# Patient Record
Sex: MALE | Race: WHITE | NOT HISPANIC OR LATINO | Employment: OTHER | URBAN - METROPOLITAN AREA
[De-identification: names, ages, dates, MRNs, and addresses within clinical notes are randomized per-mention and may not be internally consistent; named-entity substitution may affect disease eponyms.]

---

## 2022-05-03 ENCOUNTER — HOSPITAL ENCOUNTER (EMERGENCY)
Facility: CLINIC | Age: 67
Discharge: HOME | End: 2022-05-03
Attending: PHYSICIAN ASSISTANT | Admitting: PHYSICIAN ASSISTANT
Payer: MEDICARE

## 2022-05-03 VITALS
DIASTOLIC BLOOD PRESSURE: 101 MMHG | SYSTOLIC BLOOD PRESSURE: 180 MMHG | OXYGEN SATURATION: 95 % | HEART RATE: 106 BPM | RESPIRATION RATE: 20 BRPM | TEMPERATURE: 98.2 F

## 2022-05-03 DIAGNOSIS — S01.01XA LACERATION OF SCALP, INITIAL ENCOUNTER: ICD-10-CM
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
## 2022-05-03 DIAGNOSIS — S09.90XA CLOSED HEAD INJURY, INITIAL ENCOUNTER: ICD-10-CM

## 2022-05-03 PROCEDURE — 99283 EMERGENCY DEPT VISIT LOW MDM: CPT

## 2022-05-03 PROCEDURE — 12002 RPR S/N/AX/GEN/TRNK2.6-7.5CM: CPT

## 2022-05-03 PROCEDURE — 250N000009 HC RX 250: Performed by: PHYSICIAN ASSISTANT

## 2022-05-03 RX ADMIN — Medication 3 ML: at 13:31

## 2022-05-03 ASSESSMENT — ENCOUNTER SYMPTOMS
SHORTNESS OF BREATH: 0
LIGHT-HEADEDNESS: 0
NECK PAIN: 0
NUMBNESS: 0
DIZZINESS: 0
HEADACHES: 1
BACK PAIN: 0
MYALGIAS: 0
WOUND: 1

## 2022-05-03 NOTE — ED TRIAGE NOTES
Pt presents after falling and hitting his head. Stepped backwards and tripped over items in a restaurant, hitting his head on a cart.  Did not lose consciousness. Laceration noted to back of head. Bleeding controlled. Takes a baby aspirin daily.

## 2022-05-03 NOTE — DISCHARGE INSTRUCTIONS
Staples need to come out in 5-7 days. Have your primary doctor remove these.  Keep wound clean, consider placing topical antibiotic over wound for the first few days.

## 2022-05-03 NOTE — ED PROVIDER NOTES
"  History   Chief Complaint:  Head Injury       The history is provided by the patient.      Philippe Kwan is a 66 year old male with history of obesity, hypertension, hyperglycemia, and tobacco use who presents with a head injury. The patient reports that he tripped and fell backwards to the ground and hit his head about 1 hour ago at a restaurant. He reports no lightheadedness, dizziness, loss of consciousness, or shortness of breath before the fall. He reports \"seeing stars\" after the fall, but no syncope. He endorses a headache and head wound due to fall. He reports no use of blood thinners, but is on Lisinopril. The patient endorses tobacco use. The patient states that he has no concern for brain injury and declines a scan at this time. The patient is unaware of when his last tetanus vaccine was. At this time, the patient denies neck or back pain, numbness, chest pain, or leg pain. He also reports no lightheadedness, dizziness, loss of consciousness, or shortness of breath at this time. The patient reports no vision changes.       Review of Systems   Eyes: Negative for visual disturbance.   Respiratory: Negative for shortness of breath.    Cardiovascular: Negative for chest pain.   Musculoskeletal: Negative for back pain, myalgias (leg) and neck pain.   Skin: Positive for wound (back of the head).   Neurological: Positive for headaches. Negative for dizziness, syncope, light-headedness and numbness.   All other systems reviewed and are negative.    Allergies:  No Known Drug Allergies     Medications:  Lisinopril    Past Medical History:     Hypertension  Hyperglycemia  Tobacco use disorder  Obesity    Past Surgical History:    Appendectomy  Right forearm surgery    Family History:    Father: cancer    Social History:  Presents to the emergency department with his wife.   Arrives via car.  The patient is a smoker.   From [place], [place].  The patient is a .     Physical Exam     Patient " Vitals for the past 24 hrs:   BP Temp Temp src Pulse Resp SpO2   05/03/22 1422 -- 98.2  F (36.8  C) Oral -- -- --   05/03/22 1331 (!) 180/101 -- -- -- 20 --   05/03/22 1239 (!) 209/127 -- -- -- -- --   05/03/22 1236 -- -- -- 106 -- 95 %       Physical Exam  Constitutional: Pleasant. Cooperative.   Eyes: Pupils equally round and reactive  HENT: No scalp hematoma. Mild TTP to posterior scalp with overlying 3.5cm laceration. No active bleeding. No bony step-off or crepitus. No facial bone tenderness or instability. No periorbital ecchymosis or Fernandez signs. Oropharynx is normal with moist mucus membranes. No evidence for intraoral injury.  Cardiovascular: Regular rate and rhythm and without murmurs.  Respiratory: Normal respiratory effort, lungs are clear bilaterally.  Musculoskeletal: No midline cervical, thoracic, or lumbar tenderness. Normal painless ROM of the neck. No clavicular tenderness. No upper extremity tenderness. No lower extremity tenderness. Normal ROM of extremities. No tenderness with compression of the rib cage or pelvis.  Skin: No rashes.   Neurologic: Cranial nerves II-XII intact, normal cognition, no focal deficits. Alert and oriented x 3. Normal  strength. Normal leg raise. Sensation to light touch intact throughout all 4 extremities. 5/5 strength with dorsiflexion and plantarflexion bilaterally. GCS 15  Psychiatric: Normal affect.  Nursing notes and vital signs reviewed.    Emergency Department Course     Procedures  [facility]    -Laceration Repair    Date/Time: 5/3/2022 2:07 PM  Performed by: Christopher Prater PA-C  Authorized by: Christopher Prater PA-C     Risks, benefits and alternatives discussed.      ANESTHESIA (see MAR for exact dosages):     Anesthesia method:  Topical application and local infiltration    Topical anesthetic:  LET    Local anesthetic:  Lidocaine 1% WITH epi  LACERATION DETAILS     Location:  Scalp    Scalp location:  R parietal    Length (cm):   3.5    REPAIR TYPE:     Repair type:  Simple      EXPLORATION:     Hemostasis achieved with:  LET and epinephrine    Wound exploration: entire depth of wound probed and visualized      Contaminated: no      TREATMENT:     Area cleansed with:  Saline and Shur-Clens    Amount of cleaning:  Standard    Irrigation solution:  Sterile saline    Irrigation method:  Syringe    SKIN REPAIR     Repair method:  Staples    Number of staples:  4    APPROXIMATION     Approximation:  Close    POST-PROCEDURE DETAILS     Dressing:  Antibiotic ointment        PROCEDURE    Patient Tolerance:  Patient tolerated the procedure well with no immediate complications      Emergency Department Course:    Reviewed:  I reviewed nursing notes, vitals, past medical history and Care Everywhere    Assessments:  1338 I obtained history and examined the patient as noted above.   1351 I performed a procedure on the patient.   1403 I prepared the patient for discharge.    Interventions:  1331 LET 3 mL TOP    Disposition:  The patient was discharged to home.     Impression & Plan     Medical Decision Making:  Philippe Kwan is a 66 year old male who presents to the ED for evaluation after a fall.  Patient sustained a laceration to the posterior aspect of his scalp.  No LOC.  See HPI as above for additional details.  Vitals and physical exam as above.  Discussed with patient indication for head CT and consideration of C-spine imaging.  Patient declined either.  He understood risks of doing so.  Scalp laceration was repaired as above.  Staples need to be removed in 5-7 days.  [place] patient was safe for discharge to home. Discussed very close return precautions as he declined all imaging. All questions answered. Patient discharged to home in stable condition.    Diagnosis:    ICD-10-CM    1. Fall, initial encounter  W19.XXXA    2. Closed head injury, initial encounter  S09.90XA    3. Laceration of scalp, initial encounter  S01.01XA          [place]  Disclosure:  I, Irlanda Whalen, am serving as a scribe at 1:38 PM on 5/3/2022 to document services personally performed by Christopher Prater PA-C based on my observations and the provider's statements to me.     This record was created at least in part using electronic voice recognition software, so please excuse any typographical errors.       Christopher Prater PA-C  05/03/22 8449

## (undated) RX ORDER — LIDOCAINE HYDROCHLORIDE AND EPINEPHRINE 10; 10 MG/ML; UG/ML
INJECTION, SOLUTION INFILTRATION; PERINEURAL
Status: DISPENSED
Start: 2022-05-03